# Patient Record
Sex: FEMALE | Race: WHITE
[De-identification: names, ages, dates, MRNs, and addresses within clinical notes are randomized per-mention and may not be internally consistent; named-entity substitution may affect disease eponyms.]

---

## 2019-01-05 ENCOUNTER — HOSPITAL ENCOUNTER (EMERGENCY)
Dept: HOSPITAL 62 - ER | Age: 34
Discharge: HOME | End: 2019-01-05
Payer: OTHER GOVERNMENT

## 2019-01-05 VITALS — SYSTOLIC BLOOD PRESSURE: 116 MMHG | DIASTOLIC BLOOD PRESSURE: 82 MMHG

## 2019-01-05 DIAGNOSIS — M79.605: ICD-10-CM

## 2019-01-05 DIAGNOSIS — M79.672: ICD-10-CM

## 2019-01-05 DIAGNOSIS — S80.12XA: ICD-10-CM
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
## 2019-01-05 DIAGNOSIS — F17.200: ICD-10-CM

## 2019-01-05 DIAGNOSIS — Z79.899: ICD-10-CM

## 2019-01-05 DIAGNOSIS — M25.572: ICD-10-CM

## 2019-01-05 DIAGNOSIS — S92.415A: Primary | ICD-10-CM

## 2019-01-05 PROCEDURE — 73630 X-RAY EXAM OF FOOT: CPT

## 2019-01-05 PROCEDURE — 73590 X-RAY EXAM OF LOWER LEG: CPT

## 2019-01-05 PROCEDURE — 73610 X-RAY EXAM OF ANKLE: CPT

## 2019-01-05 PROCEDURE — 99284 EMERGENCY DEPT VISIT MOD MDM: CPT

## 2019-01-05 NOTE — RADIOLOGY REPORT (SQ)
EXAM DESCRIPTION:  FOOT LEFT COMPLETE



COMPLETED DATE/TIME:  1/5/2019 3:32 pm



REASON FOR STUDY:  pain after falling vasquez stairs great toe ankle and



COMPARISON:  None.



NUMBER OF VIEWS:  Three views.



TECHNIQUE:  AP, lateral and oblique  radiographic images acquired of the left foot.



LIMITATIONS:  None.



FINDINGS:  MINERALIZATION: Normal.

BONES: There is a subtle nondisplaced corner fracture of the medial distal aspect proximal phalanx le
ft great toe.

JOINTS: No effusions.

SOFT TISSUES: No soft tissue swelling.  No foreign body.

OTHER: No other significant finding.



IMPRESSION:  There is a subtle nondisplaced corner fracture of the medial distal aspect proximal phal
anx left great toe.



TECHNICAL DOCUMENTATION:  JOB ID:  [id number]

 2011 [employer]- All Rights Reserved



Reading location - IP/workstation name: [email or web address]

## 2019-01-05 NOTE — RADIOLOGY REPORT (SQ)
EXAM DESCRIPTION:  TIBIA FIBULA LEFT



COMPLETED DATE/TIME:  1/5/2019 3:32 pm



REASON FOR STUDY:  pain after falling vasquez stairs great toe ankle and



COMPARISON:  None.



NUMBER OF VIEWS:  Two views.



TECHNIQUE:  Two radiographic images acquired of the left tibia and fibula to include the knee and ank
le in at least one projection.



LIMITATIONS:  None.



FINDINGS:  MINERALIZATION: Normal.

BONES: No acute fracture or dislocation.  No worrisome bone lesions.

SOFT TISSUES: No obvious swelling or foreign body.

OTHER: No other significant finding.



IMPRESSION:  NEGATIVE STUDY OF THE LEFT TIBIA AND FIBULA. NO RADIOGRAPHIC EVIDENCE OF ACUTE INJURY.



TECHNICAL DOCUMENTATION:  JOB ID:  [id number]

 2011 [employer]- All Rights Reserved



Reading location - IP/workstation name: [email or web address]

## 2019-01-05 NOTE — RADIOLOGY REPORT (SQ)
EXAM DESCRIPTION:  ANKLE LEFT COMPLETE



COMPLETED DATE/TIME:  1/5/2019 3:32 pm



REASON FOR STUDY:  pain after falling vasquez stairs great toe ankle and



COMPARISON:  None.



NUMBER OF VIEWS:  Three views.



TECHNIQUE:  AP, lateral, and oblique radiographic images acquired of the left ankle.



LIMITATIONS:  None.



FINDINGS:  MINERALIZATION: Normal.

BONES: No acute fracture or dislocation.  No worrisome bone lesions.

JOINTS: No effusions.

SOFT TISSUES: No soft tissue swelling. No foreign body.

OTHER: No other significant finding.



IMPRESSION:  NEGATIVE STUDY OF THE LEFT ANKLE. NO RADIOGRAPHIC EVIDENCE OF ACUTE INJURY.



TECHNICAL DOCUMENTATION:  JOB ID:  [id number]

 2011 [employer]- All Rights Reserved



Reading location - IP/workstation name: [email or web address]

## 2019-01-05 NOTE — ER DOCUMENT REPORT
ED Extremity Problem, Lower





- General


Chief Complaint: Leg Injury


Stated Complaint: LEFT LEG PAIN


Time Seen by Provider: 01/05/19 14:52


Mode of Arrival: Ambulatory


Information source: Patient


Notes: 





[age]-year-old female presented to ED for complaint of pain to the left leg ankle 

and foot.  She states she tripped and fell down 5 stairs landing on her leg.  

She take bear weight on the foot or leg..  Patient is alert and oriented 

respirations regular and unlabored speaking in full sentences.  She states she 

is on methadone because while she was pregnant with her son who is at the 

bedside and is several years old she was on narcotics for kidney infections and 

they changed her from narcotics to methadone and she has been on methadone since

then.


TRAVEL OUTSIDE OF THE [place] IN LAST 30 DAYS: No





- HPI


Patient complains to provider of: Injury, Pain


Location: Ankle, Foot, Leg


Occurred: This afternoon


Where: Home


Onset/Duration: Sudden


Quality of pain: Sharp, Throbbing


Severity: Severe


Pain Level: 5


Context: Fell


Recent injury: Yes


Associated symptoms: Unable to bear weight


Exacerbated by: Hanging down, Movement, Walking


Relieved by: Elevation, Ice, Rest





- Related Data


Allergies/Adverse Reactions: 


                                        





phenazopyridine [From Pyridium] Allergy (Verified 01/05/19 14:42)


   











Past Medical History





- General


Information source: Patient





- Social History


Smoking Status: Former Smoker - She no longer smokes regular cigarettes but she 

does smoke vapor cigarettes


Chew tobacco use (# tins/day): No


Frequency of alcohol use: None


Drug Abuse: None


Occupation: Student


Lives with: Family


Family History: Reviewed & Not Pertinent


Patient has suicidal ideation: No


Patient has homicidal ideation: No





- Past Medical History


Cardiac Medical History: Reports: None


Pulmonary Medical History: Reports: None


EENT Medical History: Reports: None


Neurological Medical History: Reports: None


Endocrine Medical History: Reports: None


Renal/ Medical History: Reports: Other - Chronic kidney infections


Malignancy Medical History: Reports: None


GI Medical History: Reports: None


Musculoskeletal Medical History: Reports None


Skin Medical History: Reports None


Psychiatric Medical History: Reports: None


Traumatic Medical History: Reports: None


Infectious Medical History: Reports: None


Surgical Hx: Negative


Past Surgical History: Reports: None





- Immunizations


Immunizations up to date: Yes


Hx Diphtheria, Pertussis, Tetanus Vaccination: Yes





Review of Systems





- Review of Systems


Constitutional: No symptoms reported


EENT: No symptoms reported


Cardiovascular: No symptoms reported


Respiratory: No symptoms reported


Gastrointestinal: No symptoms reported


Genitourinary: No symptoms reported


Female Genitourinary: No symptoms reported


Musculoskeletal: Other - Pain to left lower leg ankle and great toe minimal 

swelling no bruising noted


Skin: No symptoms reported


Hematologic/Lymphatic: No symptoms reported


Neurological/Psychological: No symptoms reported


-: Yes All other systems reviewed and negative





Physical Exam





- Vital signs


Vitals: 


                                        











Temp Pulse Resp BP Pulse Ox


 


 98.4 F   99   18   131/88 H  100 


 


 01/05/19 14:48  01/05/19 14:48  01/05/19 14:48  01/05/19 14:48  01/05/19 14:48











Interpretation: Normal





- General


General appearance: Appears well, Alert





- HEENT


Head: Normocephalic, Atraumatic


Eyes: Normal


Pupils: PERRL





- Respiratory


Respiratory status: No respiratory distress


Chest status: Nontender


Breath sounds: Normal


Chest palpation: Normal





- Cardiovascular


Rhythm: Regular


Heart sounds: Normal auscultation


Murmur: No





- Abdominal


Inspection: Normal


Distension: No distension


Bowel sounds: Normal


Tenderness: Nontender


Organomegaly: No organomegaly





- Back


Back: Normal, Nontender





- Extremities


General upper extremity: Normal inspection, Nontender, Normal color, Normal ROM,

Normal temperature


General lower extremity: Normal color, Normal temperature.  No: Amanda's sign


Calf: Tender - Front of the leg


Ankle: Tender, Edema - Minimal, Limited ROM - Due to pain patient is able to 

tessa and invert plantar and dorsiflex but with pain, Unable to bear weight.  

No: Ecchymosis


Foot: Tender - Left great toe, Edema - Left great toe, No evidence of FB.  No: 

Abrasion, Deformity, Ecchymosis, Instability, Laceration, Metatarsal compress. 

pain, Nail injury, Navicular tenderness, Puncture wound





- Neurological


Neuro grossly intact: Yes


Cognition: Normal


Orientation: AAOx4


[place] Coma Scale Eye Opening: Spontaneous


[place] Coma Scale Verbal: Oriented


[place] Coma Scale Motor: Obeys Commands


[place] Coma Scale Total: 15


Speech: Normal


Motor strength normal: LUE, RUE, LLE, RLE


Sensory: Normal





- Psychological


Associated symptoms: Normal affect, Normal mood





- Skin


Skin Temperature: Warm


Skin Moisture: Dry


Skin Color: Normal





Course





- Re-evaluation


Re-evalutation: 





01/05/19 21:06


Patient was treated with postop shoe.  She was given ibuprofen in the emergency 

room.  Patient is on methadone chronically.  Patient was instructed to follow-up

with orthopedics concerning her fracture to the proximal great toe.  Patient was

instructed on elevation and ice ibuprofen and to follow-up with the primary 

doctor.





- Vital Signs


Vital signs: 


                                        











Temp Pulse Resp BP Pulse Ox


 


 98 F   82   18   116/82   99 


 


 01/05/19 16:23  01/05/19 16:23  01/05/19 16:23  01/05/19 16:23  01/05/19 16:23














- Diagnostic Test


Radiology reviewed: Image reviewed, Reports reviewed





Procedures





- Immobilization


  ** Left Foot


Time completed: 16:21


Immobilizer type: Post-op shoe


Performed by: PCT


Post-Proc Neuro Vasc Exam: Normal


Alignment checked and good: Yes





Discharge





- Discharge


Clinical Impression: 


 Contusion of left lower leg, initial encounter





Fracture of left great toe


Qualifiers:


 Encounter type: initial encounter Fracture type: closed Phalanx: proximal 

Fracture alignment: nondisplaced Qualified Code(s): S92.415A - Nondisplaced fra

cture of proximal phalanx of left great toe, initial encounter for closed 

fracture





Fall


Qualifiers:


 Encounter type: initial encounter Qualified Code(s): W19.XXXA - Unspecified 

fall, initial encounter





Condition: Stable


Disposition: HOME, SELF-CARE


Instructions:  Family Physicians / Practices


Additional Instructions: 


You have a fracture of your left great toe as I have discussed with you.  I have

given you the written report of the x-ray.  Please take this to your primary 

doctor and orthopedics for follow-up.  Have been placed in a hard soled shoe.  

Please do not go barefooted until this fracture has healed.





CONTUSION:


    Your injury has resulted in a contusion -- a crushing of the deep tissues.  

No injury to important structures was detected during the physician's exam.  

Contusions vary in the amount of pain they cause, and in the length of time 

required for healing.  Typically, the area will become bruised, and will remain 

painful to touch for two or three weeks.  However, most patients are back to 

working and playing within a few days.


     After the initial period of rest and cold-packs, your symptoms (together 

with the doctor's recommendations) will determine how rapidly you can get back 

to full activity.  Usually this means "do what feels okay, but don't do things 

that hurt."


     If re-examination was recommended, it's important to follow up as 

instructed.  Call the doctor or return any time if pain increases, if swelling 

becomes severe, if you develop numbness or weakness in an injured extremity, or 

if any other alarming symptoms occur.





USE OF TYLENOL (ACETAMINOPHEN):


     Acetaminophen may be taken for pain relief or fever control. It's much 

safer than aspirin, offering a wider range of "safe" dosages.  It is safe during

pregnancy.  Some brand names are Tylenol, Panadol, Datril, Anacin 3, Tempra, and

Liquiprin. Acetaminophen can be repeated every four hours.  The following are 

maximum recommended dosages:





WEIGHT         Dose             Drops                  Elixir        Kami

wable(80mg)


(LBS.)                            drprs=droppers    tsp=teaspoon


6               40 mg            0.4 ml (1/2)


6-11            80 mg            0.8 ml (full)              tsp                

 1       tab


12-16         120 mg           1 1/2 drprs             3/4  tsp               1 

1/2  tabs


17-23         160 mg             2  drprs             1    tsp                  

2       tabs


24-30         240 mg             3  drprs             1 1/2 tsp                3

      tabs


30-35         320 mg                                       2    tsp             

     4       tabs


36-41         360 mg                                       2 1/4   tsp          

   4 1/2 tabs


42-47         400 mg                                       2 1/2   tsp          

   5      tabs


48-53         480 mg                                       3    tsp             

     6      tabs


54-59         520 mg                                       3  1/4  tsp          

   6 1/2 tabs


60-64         560 mg                                       3  1/2  tsp          

   7      tabs 


65-70         600 mg                                       3  3/4  tsp          

   7 1/2 tabs


71-76         640 mg                                       4   tsp              

    8      tabs


77-82         720 mg                                       4 1/2   tsp          

  9      tabs


83-88         800 mg                                       5   tsp              

  10      tabs





>89 pounds or adults          650 mg to 900 mg





Acetaminophen can be repeated every four hours.  Maximum dose not to exceed 4000

mg a day.





   These maximum recommended dosages are slightly higher than the dosages 

written on the product container, but these dosages are very safe and below the 

toxic dosage for acetaminophen.





ICE & ELEVATION:


     Apply ice packs frequently against the painful area.  Many different 

schedules are recommended, such as "20 minutes on, 20 minutes off" or "one hour 

ice, two hours rest."  If you need to work, you may need to go longer between 

ice treatments.  You should plan to have the area ice packed AT LEAST one-fourth

of the time.


     The ice should be applied over the wrap, tape, or splint, or over a layer 

of cloth -- not directly against the skin.  Some ice bags have a built-in cloth 

and can be put directly on the skin.


     Your injured part should be elevated as much as possible over the next 48 

hours.  Try to keep the injury above the level of the heart. Avoid use of the 

injured area.  Elevation and rest will decrease the swelling.








USE OF OVER-THE-COUNTER IBUPROFEN:


     Ibuprofen (Advil, Nuprin, Medipren, Motrin IB) is a medication for fever 

and pain control.  In addition, it has anti- inflammatory effects which may be 

beneficial, especially in the treatment of injuries.


     It's best to take ibuprofen with food.  Persons with ulcer disease or 

allergy to aspirin should notify their physician of this before taking 

ibuprofen.


     Ibuprofen can be given every four to six hours, for a total of four doses 

daily.


     Age              Pain or fever dose          Antiinflammatory dose


     6-8 yr              200 mg (1 tab)                200 mg (1 tab)


     9-11 yr             200 mg (1 tab)                200-400 mg (1-2 tab)


     11-14 yr            200-400 mg (1-2 tab)         400 mg (2 tab)


     15-adult            400 mg (2 tab)                600 mg (3 tab)








FOLLOW-UP CARE:


If you have been referred to a physician for follow-up care, call the 

physicians office for an appointment as you were instructed or within the next 

two days.  If you experience worsening or a significant change in your symptoms,

notify the physician immediately or return to the Emergency Department at any 

time for re-evaluation.


Prescriptions: 


Ibuprofen [Motrin 800 mg Tablet] 800 mg PO Q8H PRN #20 tab


 PRN Reason: 


Forms:  Elevated Blood Pressure


Referrals: 


LUIS E LEONARDO MD [ACTIVE STAFF] - Follow up as needed

## 2019-11-22 ENCOUNTER — HOSPITAL ENCOUNTER (OUTPATIENT)
Dept: HOSPITAL 62 - SP | Age: 34
End: 2019-11-22
Attending: PHYSICIAN ASSISTANT
Payer: OTHER GOVERNMENT

## 2019-11-22 DIAGNOSIS — M79.605: Primary | ICD-10-CM

## 2019-11-22 PROCEDURE — 93971 EXTREMITY STUDY: CPT

## 2019-11-22 NOTE — RADIOLOGY REPORT (SQ)
EXAM DESCRIPTION:  TIBIA FIBULA LEFT



COMPLETED DATE/TIME:  11/22/2019 11:22 am



REASON FOR STUDY:  M79.605 LEFT LEG PAIN M79.605  PAIN IN LEFT LEG



COMPARISON:  AP and lateral views of the left tibia and fibula from 1/5/2019.



NUMBER OF VIEWS:  Two views.



TECHNIQUE:  Two radiographic images acquired of the left tibia and fibula to include the knee and ank
le in at least one projection.



LIMITATIONS:  None.



FINDINGS:  MINERALIZATION: Normal.

BONES: There is a subacute fracture of the mid fibular diaphysis with mild lateral apex angulation of
 the fracture fragments.  There is abundant peripheral callus that appears to bridge the fracture phong
e.  There is also a nondisplaced fracture of the posterior malleolus with increased sclerosis around 
the fracture line and an area of focal periosteal bone formation along the posterolateral surface of 
the mid tibial diaphysis.

SOFT TISSUES: No subcutaneous emphysema or radiopaque foreign body.

OTHER: No other fine.



IMPRESSION:  1.  Subacute fracture of the mid fibular diaphysis with mild lateral lateral apex angula
tion of the fracture fragments and evidence of healing.

2.  Subacute fracture of the posterior malleolus with evidence of healing.

3.  Focal area of periosteal bone formation along the posterolateral surface of the mid tibial diaphy
sis could represent a response to a non-displaced diaphyseal fracture.



TECHNICAL DOCUMENTATION:  JOB ID:  [id number]

 2011 [employer]- All Rights Reserved



Reading location - IP/workstation name: [email or web address]

## 2019-11-22 NOTE — RADIOLOGY REPORT (SQ)
EXAM DESCRIPTION:  VENOUS UNILATERAL LOWER



COMPLETED DATE/TIME:  11/22/2019 11:46 am



REASON FOR STUDY:  LE PAIN M79.605  PAIN IN LEFT LEG



COMPARISON:  None.



TECHNIQUE:  Dynamic and static gray scale and color images acquired of the left leg venous system. Se
lected spectral images acquired with additional compression and augmentation maneuvers. The contralat
eral common femoral vein and saphenofemoral junction were also imaged. Images stored on PACS.



LIMITATIONS:  None.



FINDINGS:  COMMON FEMORAL: Normal phasicity, compression and augmentation. No visualized echogenic ma
terial on gray scale. No defects on color images.

FEMORAL: Normal compression and augmentation. No visualized echogenic material on gray scale. No defe
cts on color images.

POPLITEAL: Normal compression, augmentation. No visualized echogenic material on gray scale. No defec
ts on color images.

CALF VESSELS: Normal compression, augmentation. No visualized echogenic material on gray scale. No de
fects on color images.

GSV and SSV: Normal compression, augmentation. No visualized echogenic material on gray scale. No def
ects on color images.

ANY DEEP VENOUS INSUFFICIENCY: Not evaluated.

ANY EVIDENCE OF POPLITEAL CYST: No.

OTHER: No other finding.

CONTRALATERAL COMMON FEMORAL VEIN AND SAPHENOFEMORAL JUNCTION:

Normal phasicity, compression and augmentation. No visualized echogenic material on gray scale. No de
fects on color images.



IMPRESSION:  NO EVIDENCE OF DVT OR SVT IN THE LEFT LEG.



TECHNICAL DOCUMENTATION:  JOB ID:  [id number]

 2011 [employer]- All Rights Reserved



Reading location - IP/workstation name: [email or web address]

## 2019-12-24 ENCOUNTER — HOSPITAL ENCOUNTER (EMERGENCY)
Dept: HOSPITAL 62 - ER | Age: 34
Discharge: HOME | End: 2019-12-24
Payer: OTHER GOVERNMENT

## 2019-12-24 VITALS — SYSTOLIC BLOOD PRESSURE: 123 MMHG | DIASTOLIC BLOOD PRESSURE: 84 MMHG

## 2019-12-24 DIAGNOSIS — L02.415: Primary | ICD-10-CM

## 2019-12-24 LAB
ADD MANUAL DIFF: NO
ALBUMIN SERPL-MCNC: 3.9 G/DL (ref 3.5–5)
ALP SERPL-CCNC: 117 U/L (ref 38–126)
ANION GAP SERPL CALC-SCNC: 10 MMOL/L (ref 5–19)
AST SERPL-CCNC: 15 U/L (ref 14–36)
BASOPHILS # BLD AUTO: 0 10^3/UL (ref 0–0.2)
BASOPHILS NFR BLD AUTO: 0.7 % (ref 0–2)
BILIRUB DIRECT SERPL-MCNC: 0.3 MG/DL (ref 0–0.4)
BILIRUB SERPL-MCNC: 0.4 MG/DL (ref 0.2–1.3)
BUN SERPL-MCNC: 10 MG/DL (ref 7–20)
CALCIUM: 9.2 MG/DL (ref 8.4–10.2)
CHLORIDE SERPL-SCNC: 103 MMOL/L (ref 98–107)
CO2 SERPL-SCNC: 29 MMOL/L (ref 22–30)
EOSINOPHIL # BLD AUTO: 0.2 10^3/UL (ref 0–0.6)
EOSINOPHIL NFR BLD AUTO: 2.6 % (ref 0–6)
ERYTHROCYTE [DISTWIDTH] IN BLOOD BY AUTOMATED COUNT: 14.2 % (ref 11.5–14)
GLUCOSE SERPL-MCNC: 75 MG/DL (ref 75–110)
HCT VFR BLD CALC: 29.8 % (ref 36–47)
HGB BLD-MCNC: 9.8 G/DL (ref 12–15.5)
LYMPHOCYTES # BLD AUTO: 1.7 10^3/UL (ref 0.5–4.7)
LYMPHOCYTES NFR BLD AUTO: 28.1 % (ref 13–45)
MCH RBC QN AUTO: 25.3 PG (ref 27–33.4)
MCHC RBC AUTO-ENTMCNC: 32.8 G/DL (ref 32–36)
MCV RBC AUTO: 77 FL (ref 80–97)
MONOCYTES # BLD AUTO: 0.5 10^3/UL (ref 0.1–1.4)
MONOCYTES NFR BLD AUTO: 8.8 % (ref 3–13)
NEUTROPHILS # BLD AUTO: 3.7 10^3/UL (ref 1.7–8.2)
NEUTS SEG NFR BLD AUTO: 59.8 % (ref 42–78)
PLATELET # BLD: 295 10^3/UL (ref 150–450)
POTASSIUM SERPL-SCNC: 4.1 MMOL/L (ref 3.6–5)
PROT SERPL-MCNC: 7.6 G/DL (ref 6.3–8.2)
RBC # BLD AUTO: 3.85 10^6/UL (ref 3.72–5.28)
TOTAL CELLS COUNTED % (AUTO): 100 %
WBC # BLD AUTO: 6.1 10^3/UL (ref 4–10.5)

## 2019-12-24 PROCEDURE — 80053 COMPREHEN METABOLIC PANEL: CPT

## 2019-12-24 PROCEDURE — 99283 EMERGENCY DEPT VISIT LOW MDM: CPT

## 2019-12-24 PROCEDURE — 73562 X-RAY EXAM OF KNEE 3: CPT

## 2019-12-24 PROCEDURE — 84703 CHORIONIC GONADOTROPIN ASSAY: CPT

## 2019-12-24 PROCEDURE — 10060 I&D ABSCESS SIMPLE/SINGLE: CPT

## 2019-12-24 PROCEDURE — 36415 COLL VENOUS BLD VENIPUNCTURE: CPT

## 2019-12-24 PROCEDURE — 85025 COMPLETE CBC W/AUTO DIFF WBC: CPT

## 2019-12-24 PROCEDURE — A6266 IMPREG GAUZE NO H20/SAL/YARD: HCPCS

## 2019-12-24 NOTE — ER DOCUMENT REPORT
ED Medical Screen (RME)





- General


Chief Complaint: Leg Swelling


Stated Complaint: RIGHT LEG PAIN


Time Seen by Provider: 12/24/19 14:09


Primary Care Provider: 


DIEGO VERDIN PA-C [Primary Care Provider] - Follow up as needed


Mode of Arrival: Ambulatory


Information source: Patient


Notes: 





This 34-year-old female presents emergency department with abscess to her right 

knee.  Reports abscess has been there for about a week.  She was evaluated by 

her primary care provider, diego verdin at [facility], 3 days ago.  They did not drain 

the knee.  She was placed on clindamycin.  She reports the knee is now more 

swollen more painful increased erythema.  Reports family history of MRSA. 











I have greeted and performed a rapid initial assessment of this patient.  A 

comprehensive ED assessment and evaluation of the patient, analysis of test 

results and completion of the medical decision making process will be conducted 

by additional ED providers.


TRAVEL OUTSIDE OF THE [place] IN LAST 30 DAYS: No





- Related Data


Allergies/Adverse Reactions: 


                                        





phenazopyridine [From Pyridium] Allergy (Verified 12/24/19 14:09)


   











Past Medical History


Renal/ Medical History: Denies: Hx Peritoneal Dialysis





- Immunizations


Immunizations up to date: Yes


Hx Diphtheria, Pertussis, Tetanus Vaccination: Yes





Physical Exam





- Vital signs


Vitals: 





                                        











Temp Pulse Resp BP Pulse Ox


 


 98.1 F   81   16   132/70 H  98 


 


 12/24/19 13:59  12/24/19 13:59  12/24/19 13:59  12/24/19 13:59  12/24/19 13:59














Course





- Vital Signs


Vital signs: 





                                        











Temp Pulse Resp BP Pulse Ox


 


 98.1 F   81   16   132/70 H  98 


 


 12/24/19 13:59  12/24/19 13:59  12/24/19 13:59  12/24/19 13:59  12/24/19 13:59














Doctor's Discharge





- Discharge


Referrals: 


DIEGO VERDIN PA-C [Primary Care Provider] - Follow up as needed

## 2019-12-24 NOTE — ER DOCUMENT REPORT
Entered by EMILI GARCIA SCRIBE  [id number] 





Acting as scribe for:PRAVEENA GOMEZ IV, MD





ED Skin Rash/Insect Bite/Abscs





- General


Chief Complaint: Abscess


Stated Complaint: RIGHT LEG PAIN


Time Seen by Provider: 19 14:09


Primary Care Provider: 


VLADIMIR JOHNSON PA-C [Primary Care Provider] - Follow up as needed


Mode of Arrival: Ambulatory


Information source: Patient


Notes: 





This 34 year old female patient presents to the emergency department today with 

complaints of an abscess to his right knee. Patient states she has had multiple 

abscesses in the past that have required incision & drainage. Patient reports 

that this abscess has been present for around x1 week. Patient states she has 

been taking 600 mg of clindamycin every 6 hours but has not noticed a change in 

this area. 


TRAVEL OUTSIDE OF THE [place] IN LAST 30 DAYS: No





- Related Data


Allergies/Adverse Reactions: 


                                        





phenazopyridine [From Pyridium] Allergy (Verified 19 14:09)


   











Past Medical History





- General


Information source: Patient





- Social History


Smoking Status: Never Smoker


Cigarette use (# per day): No


Chew tobacco use (# tins/day): No


Frequency of alcohol use: None


Drug Abuse: None


Lives with: Family


Family History: Reviewed & Not Pertinent


Patient has suicidal ideation: No


Patient has homicidal ideation: No





- Immunizations


Immunizations up to date: Yes


Hx Diphtheria, Pertussis, Tetanus Vaccination: Yes





Review of Systems





- Review of Systems


Constitutional: No symptoms reported


EENT: No symptoms reported


Cardiovascular: No symptoms reported


Respiratory: No symptoms reported


Gastrointestinal: No symptoms reported


Genitourinary: No symptoms reported


Female Genitourinary: No symptoms reported


Musculoskeletal: No symptoms reported


Skin: See HPI, Lesions


Hematologic/Lymphatic: No symptoms reported


Neurological/Psychological: No symptoms reported


-: Yes All other systems reviewed and negative





Physical Exam





- Vital signs


Vitals: 


                                        











Temp Pulse Resp BP Pulse Ox


 


 98.1 F   81   16   132/70 H  98 


 


 19 13:59  19 13:59  19 13:59  19 13:59  19 13:59














- Notes


Notes: 





Physical Exam:


 


General: Alert, appears well. 


 


HEENT: Normocephalic. Atraumatic. PERRLA. Extraocular movements intact. 

Oropharynx clear.


 


Neck: Supple. 


 


Respiratory: No respiratory distress. 


 


Abdominal: Normal Inspection. No distension. 


 


Extremities: Moves all four extremities.


 


Neurological: Normal cognition. AAOx4. Normal speech.  


 


Psychological: Normal affect. Normal Mood. 


 


Skin: Over the right patella there is a 2cm x 2cm area draining purulent 

discharge with surrounding erythema





Course





- Vital Signs


Vital signs: 


                                        











Temp Pulse Resp BP Pulse Ox


 


 98.1 F   81   16   132/70 H  98 


 


 19 14:10  19 13:59  19 14:10  19 13:59  19 14:10














- Laboratory


Result Diagrams: 


                                 19 14:28





                                 19 14:28


Laboratory results interpreted by me: 


                                        











  19





  14:28 14:28


 


Hgb  9.8 L 


 


Hct  29.8 L 


 


MCV  77 L 


 


MCH  25.3 L 


 


RDW  14.2 H 


 


Creatinine   1.55 H


 


Est GFR ( Amer)   47 L


 


Est GFR (MDRD) Non-Af   39 L














- Diagnostic Test


Radiology reviewed: Reports reviewed





Procedures





- Incision and Drainage


  ** Right Anterior Knee


Time completed: 16:44


Type: Complex


Anesthetic type: 2% Lidocaine


mL's of anesthetic: 15


Blade size: 11


I&D procedure: Chlorprep applied, Iodoform packing placed


Incision Method: Incision made by scalpel


Amount/type of drainage: small amount (<2 ml) of purulent dicharge. loculations 

bluntly dissected





Discharge





- Discharge


Clinical Impression: 


 Abscess of right knee





Condition: Good


Disposition: HOME, SELF-CARE


Instructions:  Abscess ([facility]), Oral Narcotic Medication ([facility])


Additional Instructions: 


Return to the Emergency Department without delay if any worse.





FOLLOW UP WITH [facility] IN 2 DAYS


[phone]





HOME CARE INSTRUCTIONS & INFORMATION:  Thank you for choosing us for your 

medical needs. We hope you're satisfied with the care you received.  After you 

leave, you must properly care for your problem and, at the same time, observe 

its progress.  Any condition can change.  Some illnesses can change rapidly over

hours or days.  If your condition worsens, return to the Emergency Department or

see your physician promptly.





ABOUT YOUR X-RAYS AND EKG'S:   If you had an EKG or X-rays taken, they have been

read by the Emergency Physician. The X-rays and EKG's will also be read by a 

Radiologist or Cardiologist within 24 hours.  If discrepancies are noted, you 

will be notified by telephone.  Please be certain the ED has a correct telephone

number & address where you can be reached.  Also, realize that some fractures or

abnormalities do not show up on initial X-rays.  If your symptoms continue, see 

your physician.





ABOUT YOUR LABORATORY TEST:   If you had laboratory tests, the results have been

reviewed by the Emergency Physician.  Some test results (for example cultures) 

may not be available for several days.  You will be contacted if any test result

shows you need additional treatment.  Please be certain the ED has a correct 

telephone number and address where you can be reached.





ABOUT YOUR MEDICATIONS:  You will receive instructions on how to take your 

medicine on the prescription label you receive.  Additional information may be 

provided by the Pharmacy.  If you have questions afterwards, call the ED for 

clarification or further instructions.  Some prescribed medications may cause 

drowsiness.  Do not perform tasks such as driving a car or operating machinery 

without consulting your Pharmacist.  If you feel you need a refill of pain 

medication, your condition will need re-evaluation.  Please do not call for a 

refill of any medication.





ABOUT YOUR SIGNATURE:   Signature of this document acknowledges to followin. Understanding that you received emergency treatment and that you may be 

   released before al medical problems are known or treated. Please be certain  

   the ED has a correct phone number & address where you can be reached.


   2. Acknowledgement that you will arrange for follow-up care as recommended.


   3. Authorization for the Emergency Physician to provide information to your 

follow-up Physician in order to maximize your care.





AT ANY TIME, IF YOUR SYMPTOMS CHANGE SIGNIFICANTLY OR WORSEN OR YOU DEVELOP NEW 

SYMPTOMS, RETURN TO THE EMERGENCY DEPARTMENT IMMEDIATELY FOR RE-EVALUATION.





OUR GOAL IS TO PROVIDE EXCELLENT MEDICAL CARE!





WE HOPE THAT WE HAVE MET YOUR EXPECTATIONS DURING YOUR EMERGENCY DEPARTMENT 

VISIT AND THAT YOU FEEL YOU HAVE RECEIVED EXCELLENT CARE!











Prescriptions: 


Hydrocodone/Acetaminophen [Vicodin 5-300 mg Tablet] 1 each PO Q6HP PRN #15 

tablet


 PRN Reason: 


Referrals: 


VLADIMIR JOHNSON PA-C [Primary Care Provider] - Follow up as needed





I personally performed the services described in the documentation, reviewed and

edited the documentation which was dictated to the scribe in my presence, and it

accurately records my words and actions.

## 2019-12-24 NOTE — RADIOLOGY REPORT (SQ)
EXAM DESCRIPTION:  KNEE RIGHT 3 VIEWS



COMPLETED DATE/TIME:  12/24/2019 2:41 pm



REASON FOR STUDY:  abscess, pain, swelling



COMPARISON:  None.



NUMBER OF VIEWS:  Three views.



TECHNIQUE:  AP, lateral, and sunrise patella radiographic images acquired of the right knee.



LIMITATIONS:  None.



FINDINGS:  MINERALIZATION: Normal.

BONES: No acute fracture or dislocation.  No worrisome bone lesions.

JOINT: No effusion.

SOFT TISSUES: No soft tissue swelling.  No radio-opaque foreign body.

OTHER: No other significant finding.



IMPRESSION:  NEGATIVE STUDY OF THE RIGHT KNEE. NO RADIOGRAPHIC EVIDENCE OF ACUTE INJURY.



TECHNICAL DOCUMENTATION:  JOB ID:  [id number]

 2011 [employer]- All Rights Reserved



Reading location - IP/workstation name: [email or web address]

## 2019-12-26 NOTE — ER DOCUMENT REPORT
Doctor's Note


Notes: 





12/26/19 16:34


Patient return to the emergency department on this date, she states the pharmacy

is unable to fill the narcotic pain medication due to the way that the dosage is

written.  I have shredded the original prescription and rewritten her for 

hydrocodone 5/325 as the original prescription was for hydrocodone 5/300 and the

pharmacy states that is not available.  That is my only interaction with this 

patient